# Patient Record
Sex: MALE | Race: WHITE | NOT HISPANIC OR LATINO
[De-identification: names, ages, dates, MRNs, and addresses within clinical notes are randomized per-mention and may not be internally consistent; named-entity substitution may affect disease eponyms.]

---

## 2021-02-23 ENCOUNTER — APPOINTMENT (OUTPATIENT)
Dept: PULMONOLOGY | Facility: CLINIC | Age: 51
End: 2021-02-23
Payer: COMMERCIAL

## 2021-02-23 VITALS
HEIGHT: 73 IN | SYSTOLIC BLOOD PRESSURE: 128 MMHG | WEIGHT: 208 LBS | HEART RATE: 58 BPM | TEMPERATURE: 98.4 F | BODY MASS INDEX: 27.57 KG/M2 | DIASTOLIC BLOOD PRESSURE: 78 MMHG | OXYGEN SATURATION: 97 %

## 2021-02-23 DIAGNOSIS — R93.89 ABNORMAL FINDINGS ON DIAGNOSTIC IMAGING OF OTHER SPECIFIED BODY STRUCTURES: ICD-10-CM

## 2021-02-23 DIAGNOSIS — G47.30 SLEEP APNEA, UNSPECIFIED: ICD-10-CM

## 2021-02-23 DIAGNOSIS — R06.83 SNORING: ICD-10-CM

## 2021-02-23 DIAGNOSIS — Z83.3 FAMILY HISTORY OF DIABETES MELLITUS: ICD-10-CM

## 2021-02-23 DIAGNOSIS — Z20.822 CONTACT WITH AND (SUSPECTED) EXPOSURE TO COVID-19: ICD-10-CM

## 2021-02-23 PROCEDURE — 99245 OFF/OP CONSLTJ NEW/EST HI 55: CPT | Mod: 25

## 2021-02-23 PROCEDURE — 71046 X-RAY EXAM CHEST 2 VIEWS: CPT

## 2021-02-23 PROCEDURE — 99406 BEHAV CHNG SMOKING 3-10 MIN: CPT

## 2021-02-23 PROCEDURE — 99072 ADDL SUPL MATRL&STAF TM PHE: CPT

## 2021-02-23 NOTE — PHYSICAL EXAM
[No Acute Distress] : no acute distress [Low Lying Soft Palate] : low lying soft palate [III] : Mallampati Class: III [Normal Appearance] : normal appearance [Supple] : supple [No Neck Mass] : no neck mass [No JVD] : no jvd [Normal Rate/Rhythm] : normal rate/rhythm [Normal Pulses] : normal pulses [Normal S1, S2] : normal s1, s2 [No Varicosities] : no varicosities [No Murmurs] : no murmurs [No Rubs] : no rubs [No Resp Distress] : no resp distress [Normal Palpation] : normal palpation [Normal Rhythm and Effort] : normal rhythm and effort [Clear to Auscultation Bilaterally] : clear to auscultation bilaterally [Normal to Percussion] : normal to percussion [No Abnormalities] : no abnormalities [Benign] : benign [Normal Gait] : normal gait [No Clubbing] : no clubbing [No Cyanosis] : no cyanosis [No Edema] : no edema [FROM] : FROM [Normal Color/ Pigmentation] : normal color/ pigmentation [No Rash] : no rash [No Focal Deficits] : no focal deficits [Oriented x3] : oriented x3 [Normal Affect] : normal affect

## 2021-02-23 NOTE — PROCEDURE
[FreeTextEntry1] : Blood Draw\par \par Chest x-ray\par [facility] February 16, 2021\par Asymmetric prominence left hilum rule out small left hilar mass\par \par Repeat chest x-ray\par February 23, 2021\par Normal cardiac size\par Lung fields are grossly clear\par Agree with prior radiology report left hilar fullness\par Failure vascular versus left hilar mass\par Agree with recommendation chest CT scan

## 2021-02-23 NOTE — CONSULT LETTER
[Dear  ___] : Dear  [unfilled], [Consult Letter:] : I had the pleasure of evaluating your patient, [unfilled]. [Please see my note below.] : Please see my note below. [Sincerely,] : Sincerely, [FreeTextEntry3] : Jordan Gonzalez D.O., RIAN\par  of Medicine\par formerly [facility] [employer]\par

## 2021-02-23 NOTE — REASON FOR VISIT
[Initial] : an initial visit [Abnormal CXR/ Chest CT] : an abnormal CXR/ chest CT [TextBox_44] : Sleep disordered breathing with snoring

## 2021-02-23 NOTE — HISTORY OF PRESENT ILLNESS
[Current] : current [>= 30 pack years] : >= 30 pack years [TextBox_4] : 51-year-old male\par Pulmonary consultation\par Abnormal chest x-ray\par Patient stated he had a syncopal episode with fall while at work\par Discharge back at the time did get prescription for Flexeril\par As part of his work-up he had a chest x-ray that demonstrated questionable left hilar fullness\par He states his cardiac work-up was negative\par Does not report EPS studies Holter monitoring\par Active tobacco smoker\par States he does have a smoker's cough\par Some cold weather chest congestion reported\par Not reporting any significant wheeze purulent sputum hemoptysis\par No fevers chills or sweats\par No history of diagnosis of COVID-19\par He states he has had several Covid PCR and rapid antigen is negative\par Eyes prior history of asthma pneumonia bronchitis pulmonary valve disease tuberculosis interstitial lung disease or prior noted obstructive sleep apnea\par Does report per his family without snoring\par Hard 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

## 2021-02-24 ENCOUNTER — NON-APPOINTMENT (OUTPATIENT)
Age: 51
End: 2021-02-24

## 2021-02-24 LAB
A1AT SERPL-MCNC: 138 MG/DL
ANION GAP SERPL CALC-SCNC: 15 MMOL/L
BASOPHILS # BLD AUTO: 0.11 K/UL
BASOPHILS NFR BLD AUTO: 0.8 %
BUN SERPL-MCNC: 17 MG/DL
CALCIUM SERPL-MCNC: 9.8 MG/DL
CHLORIDE SERPL-SCNC: 104 MMOL/L
CO2 SERPL-SCNC: 22 MMOL/L
CREAT SERPL-MCNC: 0.94 MG/DL
EOSINOPHIL # BLD AUTO: 0.18 K/UL
EOSINOPHIL NFR BLD AUTO: 1.3 %
GLUCOSE SERPL-MCNC: 88 MG/DL
HCT VFR BLD CALC: 49.1 %
HGB BLD-MCNC: 16.2 G/DL
IMM GRANULOCYTES NFR BLD AUTO: 0.2 %
LYMPHOCYTES # BLD AUTO: 3.98 K/UL
LYMPHOCYTES NFR BLD AUTO: 28.3 %
MAN DIFF?: NORMAL
MCHC RBC-ENTMCNC: 31.7 PG
MCHC RBC-ENTMCNC: 33 GM/DL
MCV RBC AUTO: 96.1 FL
MONOCYTES # BLD AUTO: 1 K/UL
MONOCYTES NFR BLD AUTO: 7.1 %
NEUTROPHILS # BLD AUTO: 8.76 K/UL
NEUTROPHILS NFR BLD AUTO: 62.3 %
PLATELET # BLD AUTO: 322 K/UL
POTASSIUM SERPL-SCNC: 4.7 MMOL/L
RBC # BLD: 5.11 M/UL
RBC # FLD: 13.4 %
SARS-COV-2 IGG SERPL IA-ACNC: 0.09 INDEX
SARS-COV-2 IGG SERPL QL IA: NEGATIVE
SODIUM SERPL-SCNC: 140 MMOL/L
WBC # FLD AUTO: 14.06 K/UL

## 2021-03-05 ENCOUNTER — APPOINTMENT (OUTPATIENT)
Dept: PULMONOLOGY | Facility: CLINIC | Age: 51
End: 2021-03-05
Payer: COMMERCIAL

## 2021-03-05 PROCEDURE — 95800 SLP STDY UNATTENDED: CPT

## 2021-03-08 PROCEDURE — 95800 SLP STDY UNATTENDED: CPT

## 2021-03-19 ENCOUNTER — OUTPATIENT (OUTPATIENT)
Dept: OUTPATIENT SERVICES | Facility: HOSPITAL | Age: 51
LOS: 1 days | End: 2021-03-19
Payer: COMMERCIAL

## 2021-03-19 ENCOUNTER — APPOINTMENT (OUTPATIENT)
Dept: CT IMAGING | Facility: CLINIC | Age: 51
End: 2021-03-19
Payer: COMMERCIAL

## 2021-03-19 DIAGNOSIS — R91.8 OTHER NONSPECIFIC ABNORMAL FINDING OF LUNG FIELD: ICD-10-CM

## 2021-03-19 PROCEDURE — 71260 CT THORAX DX C+: CPT | Mod: 26

## 2021-03-19 PROCEDURE — 71260 CT THORAX DX C+: CPT

## 2021-04-16 ENCOUNTER — APPOINTMENT (OUTPATIENT)
Dept: PULMONOLOGY | Facility: CLINIC | Age: 51
End: 2021-04-16

## 2021-04-17 LAB — SARS-COV-2 N GENE NPH QL NAA+PROBE: NOT DETECTED

## 2021-04-20 ENCOUNTER — APPOINTMENT (OUTPATIENT)
Dept: PULMONOLOGY | Facility: CLINIC | Age: 51
End: 2021-04-20
Payer: COMMERCIAL

## 2021-04-20 VITALS
DIASTOLIC BLOOD PRESSURE: 80 MMHG | TEMPERATURE: 98.4 F | SYSTOLIC BLOOD PRESSURE: 132 MMHG | OXYGEN SATURATION: 98 % | HEART RATE: 67 BPM

## 2021-04-20 DIAGNOSIS — D72.829 ELEVATED WHITE BLOOD CELL COUNT, UNSPECIFIED: ICD-10-CM

## 2021-04-20 DIAGNOSIS — G47.33 OBSTRUCTIVE SLEEP APNEA (ADULT) (PEDIATRIC): ICD-10-CM

## 2021-04-20 DIAGNOSIS — R59.0 LOCALIZED ENLARGED LYMPH NODES: ICD-10-CM

## 2021-04-20 PROCEDURE — 94726 PLETHYSMOGRAPHY LUNG VOLUMES: CPT

## 2021-04-20 PROCEDURE — 99406 BEHAV CHNG SMOKING 3-10 MIN: CPT

## 2021-04-20 PROCEDURE — 99072 ADDL SUPL MATRL&STAF TM PHE: CPT

## 2021-04-20 PROCEDURE — 94010 BREATHING CAPACITY TEST: CPT

## 2021-04-20 PROCEDURE — ZZZZZ: CPT

## 2021-04-20 PROCEDURE — 99214 OFFICE O/P EST MOD 30 MIN: CPT | Mod: 25

## 2021-04-20 PROCEDURE — 94729 DIFFUSING CAPACITY: CPT

## 2021-04-20 NOTE — PROCEDURE
[FreeTextEntry1] : PFT 4/20/21\par Minimal OAD \par FEV1/FVC 77 with well preserved flow rates\par  Lung Volumes Normal\par  No airtrapping\par  Resistance is normal with sp conductance  decreased\par  Low nl DLCo 70 % pred\par  HGB 16.2\par \par CT scan March 19, 2021\par Mildly enlarged borderline angel mediastinal lymph nodes largest 2.1 x 1.9 cm at right hilum\par Recommendations 3-month follow-up chest CT\par Mild emphysema\par Mild bibasilar dependent changes\par No suspicious nodules\par Normal coronary artery calcification\par \par Sleep study\par March 5–March 8, 2021\par Mild obstructive sleep apnea\par AHI 8\par O2 saturation less than 90% 2.4%\par Time snoring greater than 30 DB 26.5%\par [facility] Sleep Study Report\par Patient Name NANDA WHALEY Study Ordered by Hodan Johansen\par Date of Night 1 03/05/2021 Date of Birth 1970\par Date of Night 2 03/08/2021 Identification Number [id number]\par Overall AHI\par * Overall RDI % time < 90% SpO2 Mean SpO2 % time snoring > 30 dB\par 8 18 2.4 % 94.8 % 26.5 %\par PHYSICIAN INTERPRETATION AND COMMENTS: Mild Obstructive Sleep Apnea. Recommend autoCPAP.\par CLINICAL HISTORY: 51 year old male presented with: 17 inch neck, BMI of 28, an [place] sleepiness score of 7, no comorbidities and symptoms of nocturnal snoring and waking up choking. Based on the clinical history, the patient has a high pre-test\par probability of having moderate TUCKER.\par SLEEP STUDY FINDINGS: Patient underwent a two night Home Sleep Test and by behavioral criteria, slept for approximately\par 8.8 hours, with a sleep latency of 11 minutes and a sleep efficiency of 80.9%. Mild sleep disordered breathing (AHI=8) is noted\par based on a 4% hypopnea desaturation criteria, predominantly in the supine position (11 events/hour). The patient slept supine 37.3%\par of the night based on valid recording time of 8.83 hours and is 2.4 times as likely to have apneas/hypopneas when supine. When\par considering more subtle measures of sleep disordered breathing, the overall respiratory disturbance index is also mild (RDI=18)\par based on a 1% hypopnea desaturation criteria with confirmation by surrogate arousal indicators. The apneas/hypopneas are\par accompanied by minimal oxygen desaturation (percent time below 90% SpO2: 2.4%, Min SpO2: 85.4%). The average desaturation\par across allsleep disordered breathing events is 2.5%. Snoring occurs for 26.5% (30 dB) of the study, 18.1% is very loud. The mean\par pulse rate is 58 BPM.\par TREATMENT CONSIDERATIONS: For symptomatic mild obstructive sleep apnea, patient preference and compliance impacts\par efficacious outcomes. Consider treatment with nasal continuous positive airway pressure (CPAP/AutoPAP), mandibular\par advancement splint (MAS), referral to an ENT surgeon for modification to the airway, and/or weight loss or behavioral therapy. The\par patient should avoid sleeping supine given non-supine AHI is in the normal range.\par DISEASE MANAGEMENT CONSIDERATIONS: Sleeping pills, sedatives, alcohol, narcotics, and sleep deprivation may\par worsen TUCKER. The risk of perioperative anesthesia complications may be increased in the presence of TUCKER. Untreated TUCKER may\par increase the risk of motor vehicle accidents\par \par Chest x-ray\par [facility] radiology February 16, 2021\par Asymmetric prominence left hilum rule out small left hilar mass\par \par Repeat chest x-ray\par February 23, 2021\par Normal cardiac size\par Lung fields are grossly clear\par Agree with prior radiology report left hilar fullness\par vascular versus left hilar mass\par Agree with recommendation chest CT scan

## 2021-04-20 NOTE — CONSULT LETTER
[Dear  ___] : Dear  [unfilled], [Courtesy Letter:] : I had the pleasure of seeing your patient, [unfilled], in my office today. [Please see my note below.] : Please see my note below. [Sincerely,] : Sincerely, [FreeTextEntry3] : Jordan Gonzalez D.O., RIAN\par  of Medicine\par [facility] [employer]\par

## 2021-04-20 NOTE — HISTORY OF PRESENT ILLNESS
[Current] : current [>= 30 pack years] : >= 30 pack years [Obstructive Sleep Apnea] : obstructive sleep apnea [TextBox_4] : home sleep study-mild obstructive sleep apnea\par Completed chest CT scan\par Borderline angel mediastinal lymphadenopathy in a smoker\par Anatomical emphysema\par \par 51-year-old male\par Pulmonary consultation\par Abnormal chest x-ray\par Patient stated he had a syncopal episode with fall while at work\par Discharge back at the time did get prescription for Flexeril\par As part of his work-up he had a chest x-ray that demonstrated questionable left hilar fullness\par He states his cardiac work-up was negative\par Does not report EPS studies Holter monitoring\par Active tobacco smoker\par States he does have a smoker's cough\par Some cold weather chest congestion reported\par Not reporting any significant wheeze purulent sputum hemoptysis\par No fevers chills or sweats\par No history of diagnosis of COVID-19\par He states he has had several Covid PCR and rapid antigen is negative\par Eyes prior history of asthma pneumonia bronchitis pulmonary valve disease tuberculosis interstitial lung disease or prior noted obstructive sleep apnea\par Does report per his family without snoring\par Hard 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

## 2021-04-20 NOTE — DISCUSSION/SUMMARY
[FreeTextEntry1] : Anatomical emphysema\par stable pulmonary emphyema\par Active tobacco smoker- cut # cigs\par Mildly enlarged/borderline angel mediastinal adenopathy- r/o sarcoid\par ( historically reports  as teenager about [age] told of some enlarged nodes in chest)- no hx tissue bx\par Snoring sleep disordered breathing\par Obstructive sleep apnea\par \par Recommendations\par CT chest with contrast- Repeat 3 months and discuss IR FNA\par Noted mild leukocytosis-consider hematologic consultation\par Formal home sleep study- but put on hold because feels  working  nights- will reconsider repeat 6- 12 months\par 3-7 minute discussion with patient about smoking cessation was initiated with patient showing interest in attempting to quit smoking. Problems with risks of continued tobacco smoking including respiratory, cardiovascular, and oncological problems were discussed. Advice on smoking cessation was reiterated including methods of quitting, setting a date to quit, and different medicines and support systems available for smoking cessation was discussed. Addressed  options including nicotine patches gums lozenges, Wellbutrin, Chantix as well as smoking cessation program.\par  PFT f/u 6 months\par Blood work CBC renal function alpha-1 antitrypsin titer reviewed\par Office follow-up\par Check ACE level r/o sarcoid

## 2021-04-20 NOTE — PHYSICAL EXAM
[Low Lying Soft Palate] : low lying soft palate [No Acute Distress] : no acute distress [III] : Mallampati Class: III [Normal Appearance] : normal appearance [No Neck Mass] : no neck mass [Supple] : supple [No JVD] : no jvd [Normal Rate/Rhythm] : normal rate/rhythm [Normal Pulses] : normal pulses [Normal S1, S2] : normal s1, s2 [No Varicosities] : no varicosities [No Murmurs] : no murmurs [No Rubs] : no rubs [No Resp Distress] : no resp distress [Normal Palpation] : normal palpation [Normal Rhythm and Effort] : normal rhythm and effort [Clear to Auscultation Bilaterally] : clear to auscultation bilaterally [Normal to Percussion] : normal to percussion [No Abnormalities] : no abnormalities [Benign] : benign [Normal Gait] : normal gait [No Clubbing] : no clubbing [No Cyanosis] : no cyanosis [No Edema] : no edema [FROM] : FROM [Normal Color/ Pigmentation] : normal color/ pigmentation [No Rash] : no rash [Oriented x3] : oriented x3 [No Focal Deficits] : no focal deficits [Normal Affect] : normal affect

## 2021-04-21 ENCOUNTER — NON-APPOINTMENT (OUTPATIENT)
Age: 51
End: 2021-04-21

## 2021-04-21 LAB — ACE BLD-CCNC: 32 U/L

## 2021-06-23 ENCOUNTER — NON-APPOINTMENT (OUTPATIENT)
Age: 51
End: 2021-06-23

## 2021-06-23 DIAGNOSIS — R91.8 OTHER NONSPECIFIC ABNORMAL FINDING OF LUNG FIELD: ICD-10-CM

## 2021-08-25 ENCOUNTER — APPOINTMENT (OUTPATIENT)
Dept: INTERNAL MEDICINE | Facility: CLINIC | Age: 51
End: 2021-08-25
Payer: COMMERCIAL

## 2021-08-25 ENCOUNTER — LABORATORY RESULT (OUTPATIENT)
Age: 51
End: 2021-08-25

## 2021-08-25 VITALS
HEART RATE: 68 BPM | DIASTOLIC BLOOD PRESSURE: 92 MMHG | SYSTOLIC BLOOD PRESSURE: 161 MMHG | BODY MASS INDEX: 26.77 KG/M2 | OXYGEN SATURATION: 98 % | TEMPERATURE: 97.5 F | WEIGHT: 202 LBS | HEIGHT: 73 IN

## 2021-08-25 VITALS — SYSTOLIC BLOOD PRESSURE: 130 MMHG | DIASTOLIC BLOOD PRESSURE: 82 MMHG

## 2021-08-25 DIAGNOSIS — U07.1 COVID-19: ICD-10-CM

## 2021-08-25 DIAGNOSIS — Z00.00 ENCOUNTER FOR GENERAL ADULT MEDICAL EXAMINATION W/OUT ABNORMAL FINDINGS: ICD-10-CM

## 2021-08-25 DIAGNOSIS — J43.9 EMPHYSEMA, UNSPECIFIED: ICD-10-CM

## 2021-08-25 PROCEDURE — 99204 OFFICE O/P NEW MOD 45 MIN: CPT | Mod: 25

## 2021-08-25 NOTE — HISTORY OF PRESENT ILLNESS
[FreeTextEntry8] : s/p covid \par tested positive August 13th\par just out of quarantine yesterday\par symtpoms were mostly fatigue and body aches, lack of appetite\par \par now having nasal congestion \par

## 2021-08-25 NOTE — ASSESSMENT
[FreeTextEntry1] : postt covid- now with nasal congestion\par \par elevated BP- fist elevated reading- repeat reading improved\par \par \par smoker- follows pulma nd needs yearly CT chest\par \par \par

## 2021-08-25 NOTE — HEALTH RISK ASSESSMENT
[] : Yes [Monthly or less (1 pt)] : Monthly or less (1 point) [1 or 2 (0 pts)] : 1 or 2 (0 points) [Never (0 pts)] : Never (0 points) [Yes] : In the past 12 months have you used drugs other than those required for medical reasons? Yes [Two or more falls in past year] : Patient reported two or more falls in the past year [0] : 2) Feeling down, depressed, or hopeless: Not at all (0) [Audit-CScore] : 1 [de-identified] : Marijuana [de-identified] : Physical job [de-identified] : Normal [[id number]] : 0

## 2021-08-30 LAB
25(OH)D3 SERPL-MCNC: 22.1 NG/ML
ALBUMIN SERPL ELPH-MCNC: 4.3 G/DL
ALP BLD-CCNC: 88 U/L
ALT SERPL-CCNC: 22 U/L
ANION GAP SERPL CALC-SCNC: 14 MMOL/L
AST SERPL-CCNC: 17 U/L
BASOPHILS # BLD AUTO: 0.06 K/UL
BASOPHILS NFR BLD AUTO: 0.6 %
BILIRUB SERPL-MCNC: 0.4 MG/DL
BUN SERPL-MCNC: 16 MG/DL
CALCIUM SERPL-MCNC: 9 MG/DL
CHLORIDE SERPL-SCNC: 105 MMOL/L
CHOLEST SERPL-MCNC: 183 MG/DL
CO2 SERPL-SCNC: 22 MMOL/L
CREAT SERPL-MCNC: 0.81 MG/DL
EOSINOPHIL # BLD AUTO: 0.16 K/UL
EOSINOPHIL NFR BLD AUTO: 1.5 %
ESTIMATED AVERAGE GLUCOSE: 134 MG/DL
FOLATE SERPL-MCNC: 14.3 NG/ML
GLUCOSE SERPL-MCNC: 109 MG/DL
HBA1C MFR BLD HPLC: 6.3 %
HCT VFR BLD CALC: 46.9 %
HDLC SERPL-MCNC: 27 MG/DL
HGB BLD-MCNC: 14.9 G/DL
IMM GRANULOCYTES NFR BLD AUTO: 0.7 %
LDLC SERPL CALC-MCNC: 121 MG/DL
LYMPHOCYTES # BLD AUTO: 3.84 K/UL
LYMPHOCYTES NFR BLD AUTO: 37.1 %
MAN DIFF?: NORMAL
MCHC RBC-ENTMCNC: 31.6 PG
MCHC RBC-ENTMCNC: 31.8 GM/DL
MCV RBC AUTO: 99.4 FL
MONOCYTES # BLD AUTO: 0.67 K/UL
MONOCYTES NFR BLD AUTO: 6.5 %
NEUTROPHILS # BLD AUTO: 5.56 K/UL
NEUTROPHILS NFR BLD AUTO: 53.6 %
NONHDLC SERPL-MCNC: 157 MG/DL
PLATELET # BLD AUTO: 278 K/UL
POTASSIUM SERPL-SCNC: 4.7 MMOL/L
PROT SERPL-MCNC: 7.1 G/DL
PSA SERPL-MCNC: 0.42 NG/ML
RBC # BLD: 4.72 M/UL
RBC # FLD: 13.5 %
SODIUM SERPL-SCNC: 141 MMOL/L
TRIGL SERPL-MCNC: 180 MG/DL
TSH SERPL-ACNC: 1.25 UIU/ML
VIT B12 SERPL-MCNC: 600 PG/ML
WBC # FLD AUTO: 10.36 K/UL

## 2022-06-23 ENCOUNTER — APPOINTMENT (OUTPATIENT)
Dept: SURGERY | Facility: CLINIC | Age: 52
End: 2022-06-23
Payer: COMMERCIAL

## 2022-06-23 ENCOUNTER — APPOINTMENT (OUTPATIENT)
Dept: INTERNAL MEDICINE | Facility: CLINIC | Age: 52
End: 2022-06-23
Payer: COMMERCIAL

## 2022-06-23 VITALS
OXYGEN SATURATION: 97 % | SYSTOLIC BLOOD PRESSURE: 148 MMHG | DIASTOLIC BLOOD PRESSURE: 76 MMHG | HEART RATE: 91 BPM | BODY MASS INDEX: 26.51 KG/M2 | HEIGHT: 73 IN | WEIGHT: 200 LBS | TEMPERATURE: 97.9 F

## 2022-06-23 VITALS
TEMPERATURE: 97.6 F | DIASTOLIC BLOOD PRESSURE: 80 MMHG | OXYGEN SATURATION: 97 % | HEART RATE: 67 BPM | SYSTOLIC BLOOD PRESSURE: 138 MMHG | BODY MASS INDEX: 26.51 KG/M2 | HEIGHT: 73 IN | WEIGHT: 200 LBS

## 2022-06-23 DIAGNOSIS — Z72.0 TOBACCO USE: ICD-10-CM

## 2022-06-23 DIAGNOSIS — R22.32 LOCALIZED SWELLING, MASS AND LUMP, LEFT UPPER LIMB: ICD-10-CM

## 2022-06-23 PROCEDURE — 99203 OFFICE O/P NEW LOW 30 MIN: CPT

## 2022-06-23 PROCEDURE — 99214 OFFICE O/P EST MOD 30 MIN: CPT

## 2022-06-23 RX ORDER — VARENICLINE TARTRATE 25 MG
0.5 MG X 11 & KIT ORAL
Qty: 1 | Refills: 0 | Status: ACTIVE | COMMUNITY
Start: 2022-06-23 | End: 1900-01-01

## 2022-06-23 NOTE — PHYSICAL EXAM
[No Acute Distress] : no acute distress [No JVD] : no jugular venous distention [No Respiratory Distress] : no respiratory distress  [Clear to Auscultation] : lungs were clear to auscultation bilaterally [Regular Rhythm] : with a regular rhythm [No Edema] : there was no peripheral edema [Soft] : abdomen soft [Non Tender] : non-tender [No Focal Deficits] : no focal deficits [Alert and Oriented x3] : oriented to person, place, and time

## 2022-06-23 NOTE — HISTORY OF PRESENT ILLNESS
[FreeTextEntry1] : LUE  SKIN NODULE PRESENT X 6 WEEKS APPARENTLY GETTING BIGGER [de-identified] : firm well circumscribed non tender 3x3 cm mass lateral aspect L forearm \par stated present x 6 weeks

## 2022-06-23 NOTE — HEALTH RISK ASSESSMENT
[Current] : Current [Yes] : Yes [Monthly or less (1 pt)] : Monthly or less (1 point) [1 or 2 (0 pts)] : 1 or 2 (0 points) [Never (0 pts)] : Never (0 points) [No] : In the past 12 months have you used drugs other than those required for medical reasons? No [Two or more falls in past year] : Patient reported two or more falls in the past year [0] : 2) Feeling down, depressed, or hopeless: Not at all (0) [Audit-CScore] : 1 [de-identified] : Physical Job [de-identified] : Normal [[id number]] : 0

## 2022-06-23 NOTE — ASSESSMENT
[FreeTextEntry1] : smoking cessation was discussed and pt to try chantix which had been used previously\par \par \par modest elevation BP noted. aware need to return for comprehensive exam\par \par not clear whether excisional Bx is indicated

## 2022-06-24 NOTE — HISTORY OF PRESENT ILLNESS
[de-identified] : 51 yo male with emphysema and TUCKER presenting for evaluation of left medial forearm mass which developed about 3 months ago, has not been tender and has not changed in size. Has history of cysts, most recently left ear which was removed. No discharge from this lesion, no other such lesions at this time.

## 2022-06-24 NOTE — ASSESSMENT
[FreeTextEntry1] : 51 yo male with left forearm mass. Plan for excisional biopsy in office.. All questions answered to Patient's satisfaction. Will schedule.

## 2022-06-24 NOTE — PHYSICAL EXAM
[de-identified] : Appears well, no acute distress [de-identified] : Left medial forearm 3cm mass which is mobile, soft, non tender. No overlying skin changes.

## 2022-08-04 ENCOUNTER — APPOINTMENT (OUTPATIENT)
Dept: SURGERY | Facility: CLINIC | Age: 52
End: 2022-08-04
Payer: COMMERCIAL

## 2022-08-04 VITALS
OXYGEN SATURATION: 96 % | TEMPERATURE: 97.9 F | SYSTOLIC BLOOD PRESSURE: 128 MMHG | HEART RATE: 73 BPM | DIASTOLIC BLOOD PRESSURE: 76 MMHG | HEIGHT: 73 IN | BODY MASS INDEX: 26.51 KG/M2 | WEIGHT: 200 LBS

## 2022-08-04 PROCEDURE — 24065 BIOPSY ARM/ELBOW SOFT TISSUE: CPT

## 2022-08-04 NOTE — PROCEDURE
[FreeTextEntry1] : 53 yo male with emphysema and TUCKER presenting for evaluation of left medial forearm mass which developed about 3 months ago, has not been tender and has not changed in size. Has history of cysts, most recently left ear which was removed. No discharge from this lesion, no other such lesions at this time.\par \par After discussing the indications/risks/benefits/alternatives of excisional biopsy of left forearm lesion, and answering all of Mr. Blankenship's questions, he signed the informed consent. Left arm was prepped and draped in usual sterile fashion. The subcutaneous tissue surrounding the lesion was infiltrated with 10cc of 1% lidocaine. a 4cm incision was made on dorsal surface of forearm, just distal to elbow, overlying the lesion. Dissection was carried out sharply and with electrocautery, and a 2cm well circumscribed nodule was freed from attachments to surrounding connective tissue and passed off the field as specimen. Excellent hemostasis was achieved. Deep tissue was closed with 00 Vicryl, and the skin was closed with 000 Vicryl interrupted deep dermal suture and 0000 Monocryl running subcuticular suture. The subcutaneous tissue was then infiltrated with 5cc of 0.5% Marcaine, and Dermabond was applied. Patient tolerated the procedure well. The site was stable at 15 minutes post procedure, as were his vitals. Follow up with me in 1 week.

## 2022-08-11 ENCOUNTER — APPOINTMENT (OUTPATIENT)
Dept: SURGERY | Facility: CLINIC | Age: 52
End: 2022-08-11

## 2022-08-11 VITALS
TEMPERATURE: 98.1 F | DIASTOLIC BLOOD PRESSURE: 71 MMHG | SYSTOLIC BLOOD PRESSURE: 126 MMHG | HEART RATE: 63 BPM | BODY MASS INDEX: 26.77 KG/M2 | OXYGEN SATURATION: 97 % | HEIGHT: 73 IN | WEIGHT: 202 LBS

## 2022-08-11 PROCEDURE — 99024 POSTOP FOLLOW-UP VISIT: CPT

## 2022-08-11 NOTE — ASSESSMENT
[FreeTextEntry1] : 51 yo male with left forearm mass, s/p excisional biopsy. Healing well. Pathology not back yet, will call patient with results. Follow up with me PRN.

## 2022-08-11 NOTE — HISTORY OF PRESENT ILLNESS
[de-identified] : 53 yo male with emphysema and TUCKER presenting for evaluation of left medial forearm mass which developed about 3 months ago, has not been tender and has not changed in size. Has history of cysts, most recently left ear which was removed. No discharge from this lesion, no other such lesions at this time. [de-identified] : S/p excisional biopsy of left forearm subcutaneous lesion on 8/4/2022. Denies pain, denies discharge, skin changes, fevers/chills.

## 2022-08-11 NOTE — PHYSICAL EXAM
[de-identified] : Appears well, no acute distress [de-identified] : Left dorsal forearm incision site is clean, dry, intact, without skin changes. non tender. No distal sensory/motor deficits.

## 2022-08-19 LAB — CORE LAB BIOPSY: NORMAL

## 2023-01-16 ENCOUNTER — NON-APPOINTMENT (OUTPATIENT)
Age: 53
End: 2023-01-16